# Patient Record
Sex: FEMALE | Race: WHITE
[De-identification: names, ages, dates, MRNs, and addresses within clinical notes are randomized per-mention and may not be internally consistent; named-entity substitution may affect disease eponyms.]

---

## 2017-10-11 ENCOUNTER — HOSPITAL ENCOUNTER (EMERGENCY)
Dept: HOSPITAL 25 - UCCORT | Age: 72
Discharge: HOME | End: 2017-10-11
Payer: COMMERCIAL

## 2017-10-11 VITALS — DIASTOLIC BLOOD PRESSURE: 68 MMHG | SYSTOLIC BLOOD PRESSURE: 139 MMHG

## 2017-10-11 DIAGNOSIS — J20.9: Primary | ICD-10-CM

## 2017-10-11 DIAGNOSIS — Z87.891: ICD-10-CM

## 2017-10-11 PROCEDURE — G0463 HOSPITAL OUTPT CLINIC VISIT: HCPCS

## 2017-10-11 PROCEDURE — 99202 OFFICE O/P NEW SF 15 MIN: CPT

## 2017-10-11 NOTE — ED
Respiratory





- HPI Summary


HPI Summary: 





URI and congestion for the past few days. The cough started a few days ago and 

it is harsh. no obvious fever. no chills. no hemoptysis. No sob at rest. 





- History of Current Complaint


Chief Complaint: UCRespiratory


Stated Complaint: COUGH


Time Seen by Provider: 10/11/17 14:39


Hx Obtained From: Patient


Onset/Duration: Gradual Onset, Lasting Days


Initial Severity: Mild


Current Severity: Moderate


Character: Cough (Nonproductive)


Sputum Amount: Scant


Sputum Color: Clear


Aggravating Factor(s): Nothing


Alleviating Factor(s): Nothing


Associated Signs and Symptoms: URI, Nasal Congestion


Related History: Similar Episode/Dx as - prior bronchitis and early pneumonia.





- Allergy/Home Medications


Allergies/Adverse Reactions: 


 Allergies











Allergy/AdvReac Type Severity Reaction Status Date / Time


 


No Known Allergies Allergy   Verified 10/11/17 14:04











Home Medications: 


 Home Medications





Atorvastatin* [Lipitor*] 10 mg PO DAILY 10/11/17 [History Confirmed 10/11/17]


Menthol (Mouth-Throat) [Cough Drops] 7 mg MT SEE INSTRUCTIONS PRN 10/11/17 [

History Confirmed 10/11/17]











PMH/Surg Hx/FS Hx/Imm Hx


Previously Healthy: No - bronchitis. 





- Surgical History


Surgery Procedure, Year, and Place: Right humerous fx with pin


Infectious Disease History: No


Infectious Disease History: 


   Denies: Traveled Outside the [place] in Last 30 Days





- Family History


Known Family History: Positive: Other - mother had hx of pneumonia. 





- Social History


Occupation: Employed Full-time


Alcohol Use: Weekly


Alcohol Amount: 6


Substance Use Type: Reports: None


Smoking Status (MU): Former Smoker





Review of Systems


Positive: Sore Throat, Nasal Discharge


Positive: Cough


All Other Systems Reviewed And Are Negative: Yes





Physical Exam


Triage Information Reviewed: Yes


Vital Signs On Initial Exam: 


 Initial Vitals











Temp Pulse Resp BP Pulse Ox


 


 100 F   81   20   139/68   98 


 


 10/11/17 13:55  10/11/17 13:55  10/11/17 13:55  10/11/17 13:55  10/11/17 13:55











Vital Signs Reviewed: Yes


Appearance: Positive: Well-Appearing, No Pain Distress, Well-Nourished


Skin: Positive: Warm


Eyes: Positive: Normal, EOMI


ENT: Positive: Normal ENT inspection


Neck: Positive: Supple, Nontender, No Lymphadenopathy


Respiratory/Lung Sounds: Positive: Clear to Auscultation, Breath Sounds Present

, Unable to speak in full sentences.  Negative: Decreased Breath Sounds, Rales, 

Rhonchi, Stridor, Wheezes


Cardiovascular: Positive: Normal, RRR.  Negative: Murmur, Leg Edema Left, Leg 

Edema Right


Abdomen Description: Positive: Nontender, No Organomegaly, Soft.  Negative: 

Distended


Musculoskeletal: Positive: Normal.  Negative: Edema Left, Edema Right


Neurological: Positive: Normal, Sensory/Motor Intact, Alert, Oriented to Person 

Place, Time


Psychiatric: Positive: Normal





Diagnostics





- Vital Signs


 Vital Signs











  Temp Pulse Resp BP Pulse Ox


 


 10/11/17 13:55  100 F  81  20  139/68  98














- Laboratory


Lab Statement: Any lab studies that have been ordered have been reviewed, and 

results considered in the medical decision making process.





Disposition





- Course


Assessment/Plan: we discussed supportive care in detail. She will start z pack 

if not improved after 5 days.





- Diagnoses


Provider Diagnoses: 


 Acute bronchitis








Discharge





- Discharge Plan


Condition: Good


Disposition: HOME


Prescriptions: 


Azithromyxin VARSHA (NF) [Z-Varsha (Zithromax) 250 mg tabs #6] 1 tab PO .TODAY, THEN 

1 DAILY #6 tab


guaiFENesin/CODIEN 100MG-10MG* [Robitussin AC 100Mg-10Mg*] 10 ml PO Q4H PRN #

200 udc MDD 40


 PRN Reason: Cough


Patient Education Materials:  Acute Bronchitis (ED)


Referrals: 


Aliyah Gill MD [Primary Care Provider] - If Needed

## 2017-10-28 ENCOUNTER — HOSPITAL ENCOUNTER (EMERGENCY)
Dept: HOSPITAL 25 - UCCORT | Age: 72
Discharge: HOME | End: 2017-10-28
Payer: COMMERCIAL

## 2017-10-28 VITALS — DIASTOLIC BLOOD PRESSURE: 77 MMHG | SYSTOLIC BLOOD PRESSURE: 157 MMHG

## 2017-10-28 DIAGNOSIS — M54.2: Primary | ICD-10-CM

## 2017-10-28 DIAGNOSIS — Z87.891: ICD-10-CM

## 2017-10-28 PROCEDURE — G0463 HOSPITAL OUTPT CLINIC VISIT: HCPCS

## 2017-10-28 PROCEDURE — 99212 OFFICE O/P EST SF 10 MIN: CPT

## 2017-10-28 NOTE — UC
Neck Pain HPI





- HPI Summary


HPI Summary: 


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 C/o numbness.





- History of Current Complaint


Chief Complaint: UCUpperExtremity


Stated Complaint: NECK PAIN


Time Seen by Provider: 10/28/17 10:01


Hx Obtained From: Patient


Pregnant?: No


Onset/Duration Of Injury/Symptoms: Days - 3


Mechanism Of Injury: No Known Trauma


Onset/Duration: Gradual Onset - Started 25th and was better by the 27th but 

returned this morning., Worse Since - This morning.


Severity: Mild


Location: Discrete At: - right side of the neck, Radiates To: - the top of the 

shoulder


Character: Sharp


Aggravating Factors: Position, Movement


Alleviating Factors: Position


Associated Signs & Symptoms: Positive: Negative





- Allergies/Home Medications


Allergies/Adverse Reactions: 


 Allergies











Allergy/AdvReac Type Severity Reaction Status Date / Time


 


No Known Allergies Allergy   Verified 10/28/17 09:38











Home Medications: 


 Home Medications





Acetaminophen [Acetaminophen Extra Stren] 1,000 mg PO DAILY PRN 10/28/17 [

History Confirmed 10/28/17]


Alendronate Sodium [Fosamax-] 1 tab PO WEEKLY 10/28/17 [History Confirmed 10/28/

17]


Calcium 1 tab PO DAILY 10/28/17 [History Confirmed 10/28/17]











PMH/Surg Hx/FS Hx/Imm Hx


Previously Healthy: Yes





- Surgical History


Surgical History: Yes


Surgery Procedure, Year, and Place: Right humerous fx with pin





- Family History


Known Family History: Positive: Cardiac Disease, Diabetes, Other - mother had 

hx of pneumonia. 


   Negative: Hypertension





- Social History


Occupation: Employed Full-time


Lives: Alone - with BF


Alcohol Use: Daily


Alcohol Amount: 1 glass with dinner


Substance Use Type: None


Smoking Status (MU): Former Smoker


Have You Smoked in the Last Year: No


Household Exposure Type: Cigarettes





- Immunization History


Most Recent Influenza Vaccination: Not UTD





Review Of Systems


Musculoskeletal: Positive: Arthralgia


Neurological: Positive: Numbness - in the back of the neck.


All Other Systems Reviewed And Are Negative: Yes





Physical Exam


Triage Information Reviewed: Yes


Appearance: Well-Appearing, No Pain Distress, Well-Nourished


Vital Signs: 


 Initial Vital Signs











Temp  98.6 F   10/28/17 09:33


 


Pulse  68   10/28/17 09:33


 


Resp  16   10/28/17 09:33


 


BP  157/77   10/28/17 09:33


 


Pulse Ox  100   10/28/17 09:33











Vital Signs Reviewed: Yes


Eyes: Positive: Conjunctiva Clear


ENT: Positive: Pharynx normal, TMs normal


Neck: Positive: Supple, Tenderness @ - right cervical levators


Respiratory Exam: Normal


Cardiovascular Exam: Normal


Musculoskeletal: Positive: Other: - muscle knots in the right trapezius


Neurological Exam: Normal


Psychological Exam: Normal


Skin Exam: Normal





Neck Pain Course/Dx





- Differential Dx/Diagnosis


Differential Dx/HQI/PQRI: Sprain, Strain, Torticollis


Provider Diagnoses: Cervicalgia





Discharge





- Discharge Plan


Condition: Stable


Disposition: HOME


Prescriptions: 


Cyclobenzaprine TAB* [Flexeril 10 MG TAB*] 10 mg PO TID PRN #10 tab


 PRN Reason: Pain


Patient Education Materials:  Cervical Strain (ED), Muscle Spasm (ED)


Additional Instructions: 


Use massage with analgesic balm to help the trigger points.

## 2019-03-24 ENCOUNTER — HOSPITAL ENCOUNTER (EMERGENCY)
Dept: HOSPITAL 25 - UCCORT | Age: 74
Discharge: HOME | End: 2019-03-24
Payer: COMMERCIAL

## 2019-03-24 VITALS — SYSTOLIC BLOOD PRESSURE: 156 MMHG | DIASTOLIC BLOOD PRESSURE: 68 MMHG

## 2019-03-24 DIAGNOSIS — R23.8: Primary | ICD-10-CM

## 2019-03-24 DIAGNOSIS — Z87.891: ICD-10-CM

## 2019-03-24 PROCEDURE — G0463 HOSPITAL OUTPT CLINIC VISIT: HCPCS

## 2019-03-24 PROCEDURE — 99211 OFF/OP EST MAY X REQ PHY/QHP: CPT

## 2019-03-24 NOTE — UC
Hand/Wrist HPI





- HPI Summary


HPI Summary: 





B/l hand redness from distal metacarpals to finger tips noticed today; no pain, 

no injury. Pt has tingling in left hand in same areas. Pt reports hands are 

"always cold". Pt is concerned if sx are r/t heart conditiion. Initial BP 

reading =143/75





- History Of Current Complaint


Chief Complaint: UCSkin


Stated Complaint: BILATERAL HAND CONCERN


Time Seen by Provider: 03/24/19 13:45


Hx Obtained From: Patient


Pregnant?: No


Onset/Duration: Sudden Onset, Lasting Days


Severity Initially: Mild


Severity Currently: Mild


Pain Intensity: 0


Alleviating Factor(s): Nothing


Associated Signs And Symptoms: Positive: Redness





- Allergies/Home Medications


Allergies/Adverse Reactions: 


 Allergies











Allergy/AdvReac Type Severity Reaction Status Date / Time


 


No Known Allergies Allergy   Verified 03/24/19 13:58











Home Medications: 


 Home Medications





Acetaminophen [Acetaminophen Extra Strength] 1,000 mg PO SEE INSTRUCTIONS 03/24/ 19 [History Confirmed 03/24/19]


Cholecalciferol TAB* [Vitamin D TAB*] 2,000 units PO BID 03/24/19 [History 

Confirmed 03/24/19]











PMH/Surg Hx/FS Hx/Imm Hx


Previously Healthy: Yes





- Surgical History


Surgical History: Yes


Surgery Procedure, Year, and Place: Right humerous fx with pin





- Family History


Known Family History: Positive: Cardiac Disease, Diabetes, Other - mother had 

hx of pneumonia. 


   Negative: Hypertension





- Social History


Alcohol Use: Daily


Alcohol Amount: 1 glass with dinner


Substance Use Type: None


Smoking Status (MU): Former Smoker


Have You Smoked in the Last Year: No


Household Exposure Type: Cigarettes





- Immunization History


Most Recent Influenza Vaccination: Not UTD





Review of Systems


All Other Systems Reviewed And Are Negative: Yes


Constitutional: Positive: Negative


Skin: Positive: Other - redness and nails white


Eyes: Positive: Negative


ENT: Positive: Negative


Respiratory: Positive: Negative


Cardiovascular: Positive: Negative


Gastrointestinal: Positive: Negative


Genitourinary: Positive: Negative


Motor: Positive: Negative


Neurovascular: Positive: Negative


Musculoskeletal: Positive: Negative


Neurological: Positive: Negative


Psychological: Positive: Negative


Is Patient Immunocompromised?: No





Physical Exam


Triage Information Reviewed: Yes


Appearance: Well-Appearing, No Pain Distress, Well-Nourished


Vital Signs: 


 Initial Vital Signs











Temp  98.8 F   03/24/19 13:47


 


Pulse  62   03/24/19 13:47


 


Resp  18   03/24/19 13:47


 


BP  156/68   03/24/19 13:47


 


Pulse Ox  99   03/24/19 13:47











Vital Signs Reviewed: Yes


Eye Exam: Normal


ENT Exam: Normal


Dental Exam: Normal


Neck exam: Normal


Respiratory Exam: Normal


Cardiovascular Exam: Normal


Abdominal Exam: Normal


Bowel Sounds: Positive: Present


Musculoskeletal Exam: Normal


Neurological Exam: Normal


Psychological Exam: Normal


Skin: Positive: Other - all fingers are red, and the nail beds white and pale. 

not painful, slightly chapped, hands cold to touch





Hand/Wrist Course/Dx





- Course


Course Of Treatment: 





hx obtained, exam performed ,meds reviewed, reviewed symptoms of raynauds with 

patient.





- Differential Dx/Diagnosis


Differential Diagnosis/HQI/PQRI: Cellulitis, Other - raynauds, frost bite


Provider Diagnosis: 


 Discoloration of skin of hand








Discharge





- Sign-Out/Discharge


Documenting (check all that apply): Patient Departure


All imaging exams completed and their final reports reviewed: No Studies





- Discharge Plan


Condition: Stable


Disposition: HOME


Patient Education Materials:  Raynaud Disease (ED)


Referrals: 


Aliyah Gill MD [Primary Care Provider] - 


Additional Instructions: 


1. I am not sure what is causeing the redness and discoloration of the 

fingernails.


I would guess Raynauds.


2. I receommend going home soaking hands in war water, dry completely and 

moisturize.


3. If this persist follow up with your PCP.





- Billing Disposition and Condition


Condition: STABLE


Disposition: Home